# Patient Record
Sex: MALE | Race: WHITE | NOT HISPANIC OR LATINO | URBAN - METROPOLITAN AREA
[De-identification: names, ages, dates, MRNs, and addresses within clinical notes are randomized per-mention and may not be internally consistent; named-entity substitution may affect disease eponyms.]

---
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
## 2025-03-13 NOTE — ED PEDIATRIC NURSE NOTE - BREATHING, MLM
----- Message from Florentin Alicia MD sent at 3/12/2018  3:54 PM CDT -----  Please call, everything was ok with the biopsies, maybe mild inflammation from acid reflux   Patient aware and expressed understanding.   Spontaneous, unlabored and symmetrical ambulatory